# Patient Record
Sex: FEMALE | Race: AMERICAN INDIAN OR ALASKA NATIVE
[De-identification: names, ages, dates, MRNs, and addresses within clinical notes are randomized per-mention and may not be internally consistent; named-entity substitution may affect disease eponyms.]

---

## 2019-03-05 ENCOUNTER — HOSPITAL ENCOUNTER (EMERGENCY)
Dept: HOSPITAL 5 - ED | Age: 69
LOS: 1 days | Discharge: HOME | End: 2019-03-06
Payer: MEDICARE

## 2019-03-05 DIAGNOSIS — I10: ICD-10-CM

## 2019-03-05 DIAGNOSIS — R07.89: Primary | ICD-10-CM

## 2019-03-05 DIAGNOSIS — R06.02: ICD-10-CM

## 2019-03-05 LAB
BASOPHILS # (AUTO): 0.1 K/MM3 (ref 0–0.1)
BASOPHILS NFR BLD AUTO: 1.4 % (ref 0–1.8)
BUN SERPL-MCNC: 12 MG/DL (ref 7–17)
BUN/CREAT SERPL: 17 %
CALCIUM SERPL-MCNC: 9.8 MG/DL (ref 8.4–10.2)
EOSINOPHIL # BLD AUTO: 0.1 K/MM3 (ref 0–0.4)
EOSINOPHIL NFR BLD AUTO: 1.6 % (ref 0–4.3)
HCT VFR BLD CALC: 38.5 % (ref 30.3–42.9)
HEMOLYSIS INDEX: 5
HGB BLD-MCNC: 13.1 GM/DL (ref 10.1–14.3)
LYMPHOCYTES # BLD AUTO: 2 K/MM3 (ref 1.2–5.4)
LYMPHOCYTES NFR BLD AUTO: 42.8 % (ref 13.4–35)
MCHC RBC AUTO-ENTMCNC: 34 % (ref 30–34)
MCV RBC AUTO: 92 FL (ref 79–97)
MONOCYTES # (AUTO): 0.6 K/MM3 (ref 0–0.8)
MONOCYTES % (AUTO): 12.1 % (ref 0–7.3)
PLATELET # BLD: 266 K/MM3 (ref 140–440)
RBC # BLD AUTO: 4.17 M/MM3 (ref 3.65–5.03)

## 2019-03-05 PROCEDURE — 36415 COLL VENOUS BLD VENIPUNCTURE: CPT

## 2019-03-05 PROCEDURE — 71045 X-RAY EXAM CHEST 1 VIEW: CPT

## 2019-03-05 PROCEDURE — 93005 ELECTROCARDIOGRAM TRACING: CPT

## 2019-03-05 PROCEDURE — 85025 COMPLETE CBC W/AUTO DIFF WBC: CPT

## 2019-03-05 PROCEDURE — 99285 EMERGENCY DEPT VISIT HI MDM: CPT

## 2019-03-05 PROCEDURE — 71275 CT ANGIOGRAPHY CHEST: CPT

## 2019-03-05 PROCEDURE — 85379 FIBRIN DEGRADATION QUANT: CPT

## 2019-03-05 PROCEDURE — 93010 ELECTROCARDIOGRAM REPORT: CPT

## 2019-03-05 PROCEDURE — 84484 ASSAY OF TROPONIN QUANT: CPT

## 2019-03-05 PROCEDURE — 80048 BASIC METABOLIC PNL TOTAL CA: CPT

## 2019-03-05 NOTE — XRAY REPORT
PROCEDURE: Chest. 

 

TECHNIQUE:  Portable AP view. 

 

HISTORY: Chest pain. 

 

COMPARISONS: None.  

 

FINDINGS: 

 

The heart size is normal. There is mild tortuosity of the thoracic aorta. The lungs are grossly clear
. There are no pleural effusions. The soft tissues and regional skeleton are unremarkable. 

 

IMPRESSION:  

 

No evidence of acute disease. 

 

This document is electronically signed by Tayo Miramontes MD., March 5 2019 11:22:49 PM ET

## 2019-03-05 NOTE — EMERGENCY DEPARTMENT REPORT
ED Chest Pain HPI





- General


Chief Complaint: Chest Pain


Stated Complaint: CHEST PAINS


Time Seen by Provider: 03/05/19 18:28


Source: patient


Mode of arrival: Ambulatory


Limitations: No Limitations





- History of Present Illness


Initial Comments: 


68-year-old  female presents to the emergency department with 

complaint of having some left-sided chest pain that started about 4:30 PM this 

afternoon while she was at work.  She drank some water and tried walking around 

but there was no improvement.  Since the patient has come into the emergency 

department the chest pain is completely resolved.  She had some shortness of 

breath at the same time but that has also resolved.  She has a past medical 

history of hypertension.  She goes to [facility] for primary

care.  She does not have a cardiologist.  She says that she did have one episode

of chest pain about 2 years ago for which she went to [facility] and had a

full cardiac workup including a negative stress test at that time.  She denies 

any tobacco or illicit drug use or abuse.  No recent travel or sick contacts at 

home.





Severity scale (0 -10): 0





- Related Data


                                Home Medications











 Medication  Instructions  Recorded  Confirmed  Last Taken


 


Losartan [Cozaar] 50 mg PO QDAY 09/25/13 09/25/13 09/24/13 16:00











                                    Allergies











Allergy/AdvReac Type Severity Reaction Status Date / Time


 


No Known Allergies Allergy   Unverified 03/05/19 18:12














Heart Score





- HEART Score


History: Slightly suspicious


EKG: Normal


Age: > 65


Risk factors: 1-2 risk factors


Troponin: < normal limit


HEART Score: 3





ED Review of Systems


ROS: 


Stated complaint: CHEST PAINS


Other details as noted in HPI





Comment: All other systems reviewed and negative


Constitutional: denies: chills, fever


Eyes: denies: eye pain, vision change


ENT: denies: ear pain, throat pain


Respiratory: shortness of breath.  denies: cough, wheezing


Cardiovascular: chest pain.  denies: palpitations


Gastrointestinal: denies: abdominal pain, vomiting


Genitourinary: denies: dysuria, frequency


Musculoskeletal: denies: back pain, arthralgia


Skin: denies: rash, lesions


Neurological: denies: headache, weakness





ED Past Medical Hx





- Past Medical History


Previous Medical History?: Yes


Hx Hypertension: Yes





- Surgical History


Past Surgical History?: No





- Social History


Smoking Status: Never Smoker


Substance Use Type: None





- Medications


Home Medications: 


                                Home Medications











 Medication  Instructions  Recorded  Confirmed  Last Taken  Type


 


Losartan [Cozaar] 50 mg PO QDAY 09/25/13 09/25/13 09/24/13 16:00 History














ED Physical Exam





- General


Limitations: No Limitations





- Other


Other exam information: 





GENERAL: The patient is well-developed well-nourished.


HEENT: Normocephalic.  Atraumatic.   Patient has moist mucous membranes.


EYES:  Extraocular motions are intact.  


NECK: Supple.  Trachea is midline.


CHEST/LUNGS: Clear to auscultation.  There is no respiratory distress noted.  


HEART/CARDIOVASCULAR: Regular.  There is no tachycardia.  There is no obvious 

murmur.


ABDOMEN: Abdomen is soft, nontender.  Patient has normal bowel sounds.  There is

 no abdominal distention.


SKIN: Skin is warm and dry.


NEURO: The patient is awake, alert, and oriented.  The patient is cooperative.  

The patient has no focal neurologic deficits.  The patient has normal speech.


MUSCULOSKELETAL: There is no tenderness or deformity.  There is no limitation 

range of motion.  There is no evidence of acute injury.





ED Course


                                   Vital Signs











  03/05/19 03/05/19





  18:29 21:00


 


Temperature 98.1 F 


 


Pulse Rate 87 


 


Respiratory 16 18





Rate  


 


Blood Pressure 149/87 


 


O2 Sat by Pulse 98 98





Oximetry  














JOHN score





- John Score


Age > 65: (1) Yes


Aspirin use within the Past 7 Days: (0) No


3 or more CAD Risk Factors: (0) No


2 or more Angina events in past 24 hrs: (0) No


Known CAD with more than 50% Stenosis: (0) No


Elevated Cardiac Markers: (0) No


ST Deviation Greater than 0.5mm: (0) No


JOHN Score: 1





ED Medical Decision Making





- Lab Data


Result diagrams: 


                                 03/05/19 18:33





                                 03/05/19 18:33





- EKG Data


-: EKG Interpreted by Me


EKG shows normal: sinus rhythm, axis, intervals, QRS complexes, ST-T waves


Rate: normal





- EKG Data


When compared to previous EKG there are: previous EKG unavailable


Interpretation: normal EKG





- Radiology Data


Radiology results: report reviewed, image reviewed


interpreted by me: 





Chest x-ray does not show any pneumothorax, pleural effusion, pneumonia or 

obvious focal consolidation.





PROCEDURE: CT ANGIOGRAM OF THE CHEST FOR PULMONARY EMBOLISM 





TECHNIQUE: Computerized axial tomographic angiography of the chest and pulmonary

 arteries was 


performed after the IV injection of iodinated nonionic contrast. The image data 

was postprocessed 


using maximum intensity projection (MIP) and 2-dimensional multiplanar 

reformatted (MPR) techniques.


The examination is specifically tailored to the evaluation of the pulmonary 

arteries per clinical 


request. Automated exposure control, adjustment of mA and/or kV according to 

patient size, or 


iterative reconstruction dose optimization techniques were utilized. CPT , 

23418 





HISTORY: Shortness of breath R06.02, chest pain unspecified R07.9 , 





COMPARISONS: None . 





FINDINGS: 





Heart and pericardium: Normal. 


Thoracic aorta: There is no thoracic aortic aneurysm or dissection.. 


Pulmonary vasculature: Normal. No pulmonary emboli. 


Lymph nodes: No enlarged thoracic lymph nodes. 


Lungs: Lungs are expanded. There are no acute infiltrates.. There are no 

parenchymal lung nodules.





Pleural space: There are numerous bilateral pleural-based nodules and masses are

 calcifications. 


These could be secondary to asbestos exposure. Pleural metastases not excluded. 


There is no pleural effusion or pneumothorax. 


Musculoskeletal structures: No significant abnormality. 


Upper abdominal structures: No significant abnormality. 





IMPRESSION: 





There is no pulmonary embolism. 





There is no thoracic aortic aneurysm or dissection.. 





The lungs are expanded. There are no acute infiltrates.. There are no 

parenchymal lung nodules. 





There are numerous bilateral pleural-based nodules and masses are 

calcifications. These could be 


secondary to asbestos exposure. Pleural metastases not excluded. 





This document is electronically signed by Clint Diallo MD., March 6 2019 

12:03:00 AM ET 





Transcribed By: CO 


Dictated By: CLINT DIALLO MD 


Electronically Authenticated By: CLINT DIALLO MD 


Signed Date/Time: 03/06/19 0005 











- Medical Decision Making





This patient presents to the emergency department with complaint of some left-

sided chest pain that started around 4:30 PM and resolved by the time she came 

to the emergency department.  She has been reevaluated multiple times over 

multiple hours and there has been no return of the chest pain.  EKG is normal 

without ST elevation MI, ischemia or dysrhythmia.  Labs are mostly unremarkable 

including negative troponins 2.  She did have an elevated and equivocal d-dimer

 and therefore a CT angiography of the chest was done that did not show any 

pulmonary embolus, aneurysm, dissection or any acute process.  Vital signs 

stable throughout her ED course.  For all of these reasons the patient appears 

safe for discharge home at this time she understands the importance of following

 up with cardiology.  She also understands to return to the emergency department

 immediately with any return of her chest pain or if any acute distress.





- Differential Diagnosis


MI, PE, costochondritis, GERD


Critical Care Time: No


Critical care attestation.: 


If time is entered above; I have spent that time in minutes in the direct care 

of this critically ill patient, excluding procedure time.








ED Disposition


Clinical Impression: 


Chest pain


Qualifiers:


 Chest pain type: unspecified Qualified Code(s): R07.9 - Chest pain, unspecified





Disposition: DC-01 TO HOME OR SELFCARE


Is pt being admited?: No


Condition: Stable


Instructions:  Chest Pain (ED)


Additional Instructions: 


Please follow-up with your primary care physician.  I am giving him a referral 

for Dr. Smith, a local cardiologist, to follow up regarding your previous chest 

pain.  Return to the emergency department immediately with any return of your 

chest pain, or with any acute distress.


Referrals: 


GERMAN SMITH MD [Staff Physician] - 2-3 Days


Time of Disposition: 00:09

## 2019-03-05 NOTE — EMERGENCY DEPARTMENT REPORT
Blank Doc





- Documentation


Documentation: 





This is a 68 y.o. female that presents to ED with left sided chest pain that s

tarted while at work.  Patient reports pain as a cramping sensation.  PMH HTN.





Ordered labs and EKG





Main ED for further evaluation.

## 2019-03-06 VITALS — DIASTOLIC BLOOD PRESSURE: 90 MMHG | SYSTOLIC BLOOD PRESSURE: 146 MMHG

## 2019-03-06 NOTE — CAT SCAN REPORT
PROCEDURE: CT ANGIOGRAM OF THE CHEST FOR PULMONARY EMBOLISM 

 

TECHNIQUE:  Computerized axial tomographic angiography of the chest and pulmonary arteries was perfor
med after the IV injection of iodinated nonionic contrast. The image data was postprocessed using max
imum intensity projection (MIP) and 2-dimensional multiplanar reformatted (MPR) techniques. The exami
nation is specifically tailored to the evaluation of the pulmonary arteries per clinical request.  Au
tomated exposure control, adjustment of mA and/or kV according to patient size, or iterative reconstr
uction dose optimization techniques were utilized. CPT , 27373 

 

HISTORY:  Shortness of breath R06.02, chest pain unspecified R07.9 , 

 

COMPARISONS:  None . 

 

FINDINGS: 

 

Heart and pericardium: Normal. 

Thoracic aorta:  There is no thoracic aortic aneurysm or dissection.. 

Pulmonary vasculature: Normal. No pulmonary emboli. 

Lymph nodes:  No enlarged thoracic lymph nodes. 

Lungs:  Lungs are expanded. There are no acute infiltrates.. There are no parenchymal lung nodules. 

Pleural space:  There are numerous bilateral pleural-based nodules and masses are calcifications. The
se could be secondary to asbestos exposure. Pleural metastases not excluded. 

There is no pleural effusion or pneumothorax. 

Musculoskeletal structures:  No significant abnormality. 

Upper abdominal structures:  No significant abnormality. 

 

IMPRESSION:   

 

There is no pulmonary embolism. 

 

There is no thoracic aortic aneurysm or dissection.. 

 

The lungs are expanded. There are no acute infiltrates.. There are no parenchymal lung nodules. 

 

There are numerous bilateral pleural-based nodules and masses are calcifications. These could be seco
ndary to asbestos exposure. Pleural metastases not excluded. 

 

This document is electronically signed by Clint Balderas MD., March 6 2019 12:03:00 AM ET